# Patient Record
Sex: FEMALE | Race: ASIAN | NOT HISPANIC OR LATINO | Employment: FULL TIME | URBAN - METROPOLITAN AREA
[De-identification: names, ages, dates, MRNs, and addresses within clinical notes are randomized per-mention and may not be internally consistent; named-entity substitution may affect disease eponyms.]

---

## 2023-02-15 PROBLEM — Z90.710 HISTORY OF HYSTERECTOMY: Status: ACTIVE | Noted: 2023-02-15

## 2023-02-15 NOTE — PROGRESS NOTES
Assessment/Plan:    Encounter for gynecological examination (general) (routine) without abnormal findings  All well, no complaints aside from vaginal dryness/dyspareunia  Normal breast and pelvic exams with noted vaginismus  S/p hysterectomy  Mammo order given, last 10/26/22 reviewed on [employer] BIRADS 2B  Cologuard  per pt; orders given, aware to check for coverage with insurance prior to submitting  Dexa ordered, will check with insurance for coverage  Vaginismus  Options reviewed, directed to vaginismus  com for dilators and programs  Diagnoses and all orders for this visit:    Encounter for gynecological examination (general) (routine) without abnormal findings    Vaginal atrophy  -     Cologuard    Osteoporosis screening  -     DXA bone density spine hip and pelvis; Future    Asymptomatic menopausal state  -     DXA bone density spine hip and pelvis; Future    Other orders  -     BinaxNOW COVID-19 Ag Home Test KIT; Use as Directed on the Package  -     Multiple Vitamins-Minerals (Multivitamin Women 50+) TABS; Take by mouth  -     Cholecalciferol (Vitamin D-3) 125 MCG (5000 UT) TABS; Take by mouth  -     MAGNESIUM CITRATE PO; Take by mouth  -     Cyanocobalamin (Vitamin B 12) 500 MCG TABS; Take by mouth          Subjective:      Patient ID: Padmini Eden is a 62 y o  female  HPI Here for well check  , last 3/26/21     The following portions of the patient's history were reviewed and updated as appropriate:   She  has a past medical history of Cologuard testing (2020) and Personal history of left breast cancer ()    She   Patient Active Problem List    Diagnosis Date Noted   • Encounter for gynecological examination (general) (routine) without abnormal findings 2023   • Vaginismus 2023   • History of hysterectomy - 2008 02/15/2023   • Personal history of left breast cancer      She  has a past surgical history that includes  section; Breast lumpectomy (Left, 2007); Hysterectomy (2008); and Mammo (historical) (Bilateral, 12/18/2020)  Her family history is not on file  She  reports that she has never smoked  She has never used smokeless tobacco  She reports that she does not drink alcohol and does not use drugs  Current Outpatient Medications   Medication Sig Dispense Refill   • BinaxNOW COVID-19 Ag Home Test KIT Use as Directed on the Package     • Cholecalciferol (Vitamin D-3) 125 MCG (5000 UT) TABS Take by mouth     • Cyanocobalamin (Vitamin B 12) 500 MCG TABS Take by mouth     • MAGNESIUM CITRATE PO Take by mouth     • Multiple Vitamins-Minerals (Multivitamin Women 50+) TABS Take by mouth       No current facility-administered medications for this visit  She has No Known Allergies       Review of Systems  No breast, bladder, bowel changes   No new persistent pain, bloating, early satiety or pelvic pressure      Objective:      /70 (BP Location: Left arm, Patient Position: Sitting, Cuff Size: Standard)   Ht 5' (1 524 m)   Wt 57 8 kg (127 lb 6 4 oz)   BMI 24 88 kg/m²          Physical Exam    General appearance: no distress, pleasant  Neck: thyroid without nodules or thyromegaly, no palpable adenopathy  Lymph nodes: no palpable adenopathy  Breasts: no masses, nodes or skin changes; left well healed scar and XRT changes  Abdomen: soft, non tender, no palpable masses  Pelvic exam: normal atrophic external genitalia, urethral meatus normal, vagina atrophic with vaginismus noted with pedes spec and single digit, cuff intact, no adnexal masses, non tender  Rectal exam: normal sphincter tone, no masses, RV confirms above

## 2023-02-17 ENCOUNTER — ANNUAL EXAM (OUTPATIENT)
Dept: OBGYN CLINIC | Facility: CLINIC | Age: 58
End: 2023-02-17

## 2023-02-17 VITALS
HEIGHT: 60 IN | DIASTOLIC BLOOD PRESSURE: 70 MMHG | BODY MASS INDEX: 25.01 KG/M2 | WEIGHT: 127.4 LBS | SYSTOLIC BLOOD PRESSURE: 110 MMHG

## 2023-02-17 DIAGNOSIS — Z01.419 ENCOUNTER FOR GYNECOLOGICAL EXAMINATION (GENERAL) (ROUTINE) WITHOUT ABNORMAL FINDINGS: Primary | ICD-10-CM

## 2023-02-17 DIAGNOSIS — Z13.820 OSTEOPOROSIS SCREENING: ICD-10-CM

## 2023-02-17 DIAGNOSIS — N95.2 VAGINAL ATROPHY: ICD-10-CM

## 2023-02-17 DIAGNOSIS — Z78.0 ASYMPTOMATIC MENOPAUSAL STATE: ICD-10-CM

## 2023-02-17 PROBLEM — N94.2 VAGINISMUS: Status: ACTIVE | Noted: 2023-02-17

## 2023-02-17 RX ORDER — CYANOCOBALAMIN (VITAMIN B-12) 500 MCG
TABLET ORAL
COMMUNITY

## 2023-02-17 RX ORDER — COVID-19 MOLECULAR TEST ASSAY
KIT MISCELLANEOUS
COMMUNITY
Start: 2023-01-29

## 2023-02-17 NOTE — ASSESSMENT & PLAN NOTE
All well, no complaints aside from vaginal dryness/dyspareunia  Normal breast and pelvic exams with noted vaginismus  S/p hysterectomy  Mammo order given, last 10/26/22 reviewed on [employer] BIRADS 2B  Cologuard 2020 per pt; orders given, aware to check for coverage with insurance prior to submitting  Dexa ordered, will check with insurance for coverage

## 2023-02-17 NOTE — PATIENT INSTRUCTIONS
Return to office in one year unless having any problems such as breast changes, bleeding, new persistent pain, new progressive bloating, new problems eating (getting full to quickly) or new constant urinary pressure that does not resolve in one week  Call in six months to schedule your annual visit  Dilators are available [employer]  com    Call your insurance to see your out of pocket expense will be for the dexa (with the codes listed) and the cologuard at the lab listed

## 2023-02-17 NOTE — LETTER
February 17, 2023     Carolee Boas, DO  P O  [facility]    Patient: Kelly Grider   YOB: 1965   Date of Visit: 2/17/2023       Dear Dr Villatoro Arm: Thank you for referring Kelly Grider to me for evaluation  Below are my notes for this consultation  If you have questions, please do not hesitate to call me  I look forward to following your patient along with you  Sincerely,        Roopa Ibarra MD        CC: No Recipients  Roopa Ibarra MD  2/17/2023  9:04 AM  Sign when Signing Visit  Assessment/Plan:    Encounter for gynecological examination (general) (routine) without abnormal findings  All well, no complaints aside from vaginal dryness/dyspareunia  Normal breast and pelvic exams with noted vaginismus  S/p hysterectomy  Mammo order given, last 10/26/22 reviewed on [employer] 2B  Cologuard 2020 per pt; orders given, aware to check for coverage with insurance prior to submitting  Dexa ordered, will check with insurance for coverage  Vaginismus  Options reviewed, directed to vaginismus  com for dilators and programs  Diagnoses and all orders for this visit:    Encounter for gynecological examination (general) (routine) without abnormal findings    Vaginal atrophy  -     Cologuard    Osteoporosis screening  -     DXA bone density spine hip and pelvis; Future    Asymptomatic menopausal state  -     DXA bone density spine hip and pelvis; Future    Other orders  -     BinaxNOW COVID-19 Ag Home Test KIT; Use as Directed on the Package  -     Multiple Vitamins-Minerals (Multivitamin Women 50+) TABS; Take by mouth  -     Cholecalciferol (Vitamin D-3) 125 MCG (5000 UT) TABS; Take by mouth  -     MAGNESIUM CITRATE PO; Take by mouth  -     Cyanocobalamin (Vitamin B 12) 500 MCG TABS; Take by mouth         Subjective:     Patient ID: Kelly Grider is a 62 y o  female  HPI Here for well check  , last 3/26/21     The following portions of the patient's history were reviewed and updated as appropriate:   She  has a past medical history of Cologuard testing (2020) and Personal history of left breast cancer ()  She   Patient Active Problem List    Diagnosis Date Noted   • Encounter for gynecological examination (general) (routine) without abnormal findings 2023   • Vaginismus 2023   • History of hysterectomy - 2008 02/15/2023   • Personal history of left breast cancer      She  has a past surgical history that includes  section; Breast lumpectomy (Left, ); Hysterectomy (); and Mammo (historical) (Bilateral, 2020)  Her family history is not on file  She  reports that she has never smoked  She has never used smokeless tobacco  She reports that she does not drink alcohol and does not use drugs  Current Outpatient Medications   Medication Sig Dispense Refill   • BinaxNOW COVID-19 Ag Home Test KIT Use as Directed on the Package     • Cholecalciferol (Vitamin D-3) 125 MCG (5000 UT) TABS Take by mouth     • Cyanocobalamin (Vitamin B 12) 500 MCG TABS Take by mouth     • MAGNESIUM CITRATE PO Take by mouth     • Multiple Vitamins-Minerals (Multivitamin Women 50+) TABS Take by mouth       No current facility-administered medications for this visit  She has No Known Allergies       Review of Systems No breast, bladder, bowel changes   No new persistent pain, bloating, early satiety or pelvic pressure      Objective:      /70 (BP Location: Left arm, Patient Position: Sitting, Cuff Size: Standard)   Ht 5' (1 524 m)   Wt 57 8 kg (127 lb 6 4 oz)   BMI 24 88 kg/m²         Physical Exam   General appearance: no distress, pleasant  Neck: thyroid without nodules or thyromegaly, no palpable adenopathy  Lymph nodes: no palpable adenopathy  Breasts: no masses, nodes or skin changes; left well healed scar and XRT changes  Abdomen: soft, non tender, no palpable masses  Pelvic exam: normal atrophic external genitalia, urethral meatus normal, vagina atrophic with vaginismus noted with pedes spec and single digit, cuff intact, no adnexal masses, non tender  Rectal exam: normal sphincter tone, no masses, RV confirms above

## 2023-10-19 ENCOUNTER — TELEPHONE (OUTPATIENT)
Dept: OBGYN CLINIC | Facility: CLINIC | Age: 58
End: 2023-10-19

## 2023-10-30 ENCOUNTER — TELEPHONE (OUTPATIENT)
Dept: OBGYN CLINIC | Facility: CLINIC | Age: 58
End: 2023-10-30

## 2023-10-30 NOTE — TELEPHONE ENCOUNTER
Reviewed provided results and recommendations with patient. Patient wishes to be seen this week if possible due to insurance changes occurring soon. Patient transferred to  to be scheduled.

## 2023-10-30 NOTE — TELEPHONE ENCOUNTER
Patient requesting the results of her Dexa scan. Results printed off and sent to [facility] to be scanned in patient's chart and confirmation fax received. Patient requesting a call back and inquiring if she will need to start taking calcium supplements or start dietary changes. Dr. Carloz Trevino, please advise once Dexa scan arrives in chart. Hard copy also printed it out too as well.

## 2023-10-30 NOTE — TELEPHONE ENCOUNTER
Reviewed on [employer]. She has osteoporosis in the hip and should be seen to discuss therapeutic options including medication. Until her visit she should definitely strive for 1200 mg of calcium and 1000 IU of vitamin D daily in diet and supplements combined. You can only absorb 600 mg of calcium at a time. Avoid excess calcium as this may adversely effect your heart. There are 400 mg of calcium in an 8 oz serving of dairy.   Thanks

## 2023-10-31 ENCOUNTER — OFFICE VISIT (OUTPATIENT)
Dept: OBGYN CLINIC | Facility: CLINIC | Age: 58
End: 2023-10-31
Payer: COMMERCIAL

## 2023-10-31 VITALS
DIASTOLIC BLOOD PRESSURE: 80 MMHG | HEIGHT: 60 IN | WEIGHT: 129.4 LBS | SYSTOLIC BLOOD PRESSURE: 126 MMHG | BODY MASS INDEX: 25.4 KG/M2

## 2023-10-31 DIAGNOSIS — Z85.3 PERSONAL HISTORY OF BREAST CANCER: ICD-10-CM

## 2023-10-31 DIAGNOSIS — M81.0 AGE-RELATED OSTEOPOROSIS WITHOUT CURRENT PATHOLOGICAL FRACTURE: Primary | ICD-10-CM

## 2023-10-31 PROCEDURE — 99214 OFFICE O/P EST MOD 30 MIN: CPT | Performed by: OBSTETRICS & GYNECOLOGY

## 2023-10-31 RX ORDER — ALENDRONATE SODIUM 70 MG/1
70 TABLET ORAL
Qty: 12 TABLET | Refills: 4 | Status: SHIPPED | OUTPATIENT
Start: 2023-10-31

## 2023-10-31 NOTE — PATIENT INSTRUCTIONS
Continue to strive for 1200 mg of calcium and 1000 IU of vitamin D daily in diet and supplements combined for your bone health. You can only absorb 600 mg of calcium at a time. Avoid excess calcium as this may adversely effect your heart. There are 400 mg of calcium in an 8 oz serving of dairy.

## 2023-10-31 NOTE — ASSESSMENT & PLAN NOTE
Reviewed dexa from [facility]- 10/25/23 dexa with spine T-1.8; hip T-2.1; fem neck T-3.3    Discussed osteoporosis and impact on bone health. Reviewed risk of hip and vertebral fracture risk. Reviewed impact of hip fracture on overall morbidity and mortality. Encouraged consideration of health impact of diagnosis balanced with risks of medications when making decisions on medications versus conservative monitoring. Recommend consideration of medication to decrease fracture risk. Risks and benefits of bisphosphonates, specifically Fosamax and Reclast reviewed. Aware of GI side effects of bisphosphonates and appropriate administration technique to decrease risk. Aware of increased risk of osteonecrosis of jaw, muscle and joint aches and increased risk of long bone fracture with prolonged use of bisphosphonates. Reviewed appropriate calcium intake in dairy and supplement form to achieve recommended 1200-1500mg per day. Advised against over supplementing of calcium which may be associated with cardiovascular risk. Vitamin D supplementation recommended and dosing discussed. At the end of our discussion, pt would like to start Fosamax. Reviewed recent labs from PCP. I have spent a total time of 35 minutes on 10/31/23 in caring for this patient including Diagnostic results, Risks and benefits of tx options, Instructions for management, Impressions, Counseling / Coordination of care, Documenting in the medical record, Reviewing / ordering tests, medicine, procedures  , and Obtaining or reviewing history  . Repeat dexa in 2 years for follow up.

## 2023-10-31 NOTE — PROGRESS NOTES
Assessment/Plan:    Osteoporosis without current pathological fracture  [facility]- 10/25/23 dexa with spine T-1.8; hip T-2.1; fem neck T-3.3    Discussed osteoporosis and impact on bone health. Reviewed risk of hip and vertebral fracture risk. Reviewed impact of hip fracture on overall morbidity and mortality. Encouraged consideration of health impact of diagnosis balanced with risks of medications when making decisions on medications versus conservative monitoring. Recommend consideration of medication to decrease fracture risk. Risks and benefits of bisphosphonates, specifically Fosamax and Reclast reviewed. Aware of GI side effects of bisphosphonates and appropriate administration technique to decrease risk. Aware of increased risk of osteonecrosis of jaw, muscle and joint aches and increased risk of long bone fracture with prolonged use of bisphosphonates. Reviewed appropriate calcium intake in dairy and supplement form to achieve recommended 1200-1500mg per day. Advised against over supplementing of calcium which may be associated with cardiovascular risk. Vitamin D supplementation recommended and dosing discussed. At the end of our discussion, pt would like to start Fosamax. Reviewed recent labs from PCP. I have spent a total time of 35 minutes on 10/31/23 in caring for this patient including Diagnostic results, Risks and benefits of tx options, Instructions for management, Impressions, Counseling / Coordination of care, Documenting in the medical record, Reviewing / ordering tests, medicine, procedures  , and Obtaining or reviewing history  . Repeat dexa in 2 years for follow up. Diagnoses and all orders for this visit:    Age-related osteoporosis without current pathological fracture  -     alendronate (FOSAMAX) 70 mg tablet; Take 1 tablet (70 mg total) by mouth every 7 days    Personal history of left breast cancer  -     Ambulatory Referral to [facility];  Future Subjective:      Patient ID: Cherl Boast is a 62 y.o. female. HPI Here for dexa discussion    The following portions of the patient's history were reviewed and updated as appropriate: She  has a past medical history of Cologuard testing (2020) and Personal history of left breast cancer (). She   Patient Active Problem List    Diagnosis Date Noted    Osteoporosis without current pathological fracture 10/31/2023    Encounter for gynecological examination (general) (routine) without abnormal findings 2023    Vaginismus 2023    History of hysterectomy - 2008 02/15/2023    Personal history of left breast cancer      She  has a past surgical history that includes  section; Breast lumpectomy (Left, ); Hysterectomy (); and Mammo (historical) (Bilateral, 2020). Her family history is not on file. She  reports that she has never smoked. She has never used smokeless tobacco. She reports that she does not drink alcohol and does not use drugs. Current Outpatient Medications   Medication Sig Dispense Refill    alendronate (FOSAMAX) 70 mg tablet Take 1 tablet (70 mg total) by mouth every 7 days 12 tablet 4    Cholecalciferol (Vitamin D-3) 125 MCG (5000 UT) TABS Take by mouth      Cyanocobalamin (Vitamin B 12) 500 MCG TABS Take 5,000 mcg by mouth in the morning      MAGNESIUM CITRATE PO Take by mouth      Multiple Vitamins-Minerals (Multivitamin Women 50+) TABS Take by mouth      BinaxNOW COVID-19 Ag Home Test KIT Use as Directed on the Package (Patient not taking: Reported on 10/31/2023)       No current facility-administered medications for this visit. She has No Known Allergies. .    Review of Systems      Objective:      /80   Ht 4' 11.5" (1.511 m)   Wt 58.7 kg (129 lb 6.4 oz)   Breastfeeding No   BMI 25.70 kg/m²          Physical Exam    Appears well, no apparent distress. Does not appear anxious or depressed.

## 2023-11-20 DIAGNOSIS — Z13.820 OSTEOPOROSIS SCREENING: ICD-10-CM

## 2023-11-20 DIAGNOSIS — Z78.0 ASYMPTOMATIC MENOPAUSAL STATE: ICD-10-CM

## 2024-01-26 ENCOUNTER — TELEPHONE (OUTPATIENT)
Dept: OBGYN CLINIC | Facility: CLINIC | Age: 59
End: 2024-01-26

## 2024-01-26 DIAGNOSIS — Z12.31 ENCOUNTER FOR SCREENING MAMMOGRAM FOR MALIGNANT NEOPLASM OF BREAST: Primary | ICD-10-CM

## 2024-01-26 NOTE — TELEPHONE ENCOUNTER
Patient is requesting a mammogram order be entered so she can print it from her [employer] and get her imaging done soon.    If appropriate, please enter order and let patient know.      Thank you!

## 2024-01-26 NOTE — TELEPHONE ENCOUNTER
Patient called and left v/m requesting a mammogram order.  Please advise if pt needs regular screening mammogram or something more in depth.  Dr. Mcnamara, please advise.

## 2024-01-26 NOTE — TELEPHONE ENCOUNTER
Mammogram order placed.  Spoke with patient and informed her order is placed. Patient advised to print out on [employer].  She verbalized understanding and voiced appreciation.

## 2024-01-29 NOTE — TELEPHONE ENCOUNTER
Patient called requesting mammo order be mailed to her confirmed address in chart.  Mailed as requested

## 2024-02-02 DIAGNOSIS — Z12.31 ENCOUNTER FOR SCREENING MAMMOGRAM FOR MALIGNANT NEOPLASM OF BREAST: ICD-10-CM

## 2024-02-21 PROBLEM — Z01.419 ENCOUNTER FOR GYNECOLOGICAL EXAMINATION (GENERAL) (ROUTINE) WITHOUT ABNORMAL FINDINGS: Status: RESOLVED | Noted: 2023-02-17 | Resolved: 2024-02-21

## 2024-02-21 LAB — COLOGUARD RESULT REPORTABLE: NEGATIVE

## 2024-02-21 NOTE — PROGRESS NOTES
Assessment/Plan:    Encounter for gynecological examination (general) (routine) without abnormal findings  Here for well check, doing well on fosamax with mild joint/muscle aches.   No breast or gyn complaints.  Normal breast and pelvic exams s/p hysterectomy  Mammo order given, last 24  Cologuard negative 2024, due   Dexa 10/2023, osteoporosis with fosamax initiation; due        Diagnoses and all orders for this visit:    Encounter for gynecological examination (general) (routine) without abnormal findings    Personal history of left breast cancer  -     Mammo screening bilateral w 3d & cad; Future    Age-related osteoporosis without current pathological fracture    History of hysterectomy -     Encounter for screening mammogram for malignant neoplasm of breast  -     Mammo screening bilateral w 3d & cad; Future    Other orders  -     Misc Natural Products (HM Joint Health Ultra) TABS; Take by mouth          Subjective:      Patient ID: Marry Moreno is a 59 y.o. female.    HPI Here for well check.    The following portions of the patient's history were reviewed and updated as appropriate: She  has a past medical history of Cologuard testing (2020), History of screening mammography (2024), and Personal history of left breast cancer ().  She   Patient Active Problem List    Diagnosis Date Noted    Osteoporosis without current pathological fracture 10/31/2023    Vaginismus 2023    History of hysterectomy - 2008 02/15/2023    Personal history of left breast cancer      She  has a past surgical history that includes  section; Breast lumpectomy (Left, ); Hysterectomy (); and Mammo (historical) (Bilateral, 2020).  Her family history is not on file.  She  reports that she has never smoked. She has never used smokeless tobacco. She reports that she does not drink alcohol and does not use drugs.  Current Outpatient Medications   Medication Sig Dispense Refill     alendronate (FOSAMAX) 70 mg tablet Take 1 tablet (70 mg total) by mouth every 7 days 12 tablet 4    Cholecalciferol (Vitamin D-3) 125 MCG (5000 UT) TABS Take by mouth      Cyanocobalamin (Vitamin B 12) 500 MCG TABS Take 5,000 mcg by mouth in the morning      MAGNESIUM CITRATE PO Take by mouth      Misc Natural Products (HM Joint Health Ultra) TABS Take by mouth      Multiple Vitamins-Minerals (Multivitamin Women 50+) TABS Take by mouth       No current facility-administered medications for this visit.     She has No Known Allergies..    Review of Systems  No breast, bladder, bowel changes. No new persistent pain, bloating, early satiety or pelvic pressure  Some joint/muscle aches with fosamax and before    Objective:      /72   Ht 5' (1.524 m)   Wt 60.5 kg (133 lb 6.4 oz)   Breastfeeding No   BMI 26.05 kg/m²          Physical Exam    General appearance: no distress, pleasant  Neck: thyroid without nodules or thyromegaly, no palpable adenopathy  Lymph nodes: no palpable adenopathy  Breasts: no masses, nodes or skin changes; s/p left lumpectomy and XRT  Abdomen: soft, non tender, no palpable masses  Pelvic exam: normal atrophic external genitalia, urethral meatus normal, *pedes spec*vagina atrophic without lesions, cuff intact, no adnexal masses, non tender  Rectal exam: normal sphincter tone, no masses, RV confirms above

## 2024-02-23 ENCOUNTER — ANNUAL EXAM (OUTPATIENT)
Dept: OBGYN CLINIC | Facility: CLINIC | Age: 59
End: 2024-02-23
Payer: COMMERCIAL

## 2024-02-23 VITALS
DIASTOLIC BLOOD PRESSURE: 72 MMHG | BODY MASS INDEX: 26.19 KG/M2 | HEIGHT: 60 IN | SYSTOLIC BLOOD PRESSURE: 110 MMHG | WEIGHT: 133.4 LBS

## 2024-02-23 DIAGNOSIS — Z12.31 ENCOUNTER FOR SCREENING MAMMOGRAM FOR MALIGNANT NEOPLASM OF BREAST: ICD-10-CM

## 2024-02-23 DIAGNOSIS — Z90.710 HISTORY OF HYSTERECTOMY: ICD-10-CM

## 2024-02-23 DIAGNOSIS — Z01.419 ENCOUNTER FOR GYNECOLOGICAL EXAMINATION (GENERAL) (ROUTINE) WITHOUT ABNORMAL FINDINGS: Primary | ICD-10-CM

## 2024-02-23 DIAGNOSIS — Z85.3 PERSONAL HISTORY OF BREAST CANCER: ICD-10-CM

## 2024-02-23 DIAGNOSIS — M81.0 AGE-RELATED OSTEOPOROSIS WITHOUT CURRENT PATHOLOGICAL FRACTURE: ICD-10-CM

## 2024-02-23 PROCEDURE — S0612 ANNUAL GYNECOLOGICAL EXAMINA: HCPCS | Performed by: OBSTETRICS & GYNECOLOGY

## 2024-02-23 RX ORDER — MULTIVITAMIN/IRON/FOLIC ACID 18MG-0.4MG
TABLET ORAL
COMMUNITY

## 2024-02-23 NOTE — ASSESSMENT & PLAN NOTE
Here for well check, doing well on fosamax with mild joint/muscle aches.   No breast or gyn complaints.  Normal breast and pelvic exams s/p hysterectomy  Mammo order given, last 2/1/24  Cologuard negative 2/2024, due 2027  Dexa 10/2023, osteoporosis with fosamax initiation; due 2025

## 2024-02-23 NOTE — LETTER
February 23, 2024     Vance Morejon DO  [address]  [place] [address]    Patient: Marry Moreno   YOB: 1965   Date of Visit: 2/23/2024       Dear Dr. Morejon:    Marry Moreno was in today for her annual gyn exam. Below are my notes for this consultation.    If you have questions, please do not hesitate to call me. I look forward to following your patient along with you.         Sincerely,        Randa Mcnamara MD        CC: No Recipients    Randa Mcnamara MD  2/23/2024  8:49 AM  Sign when Signing Visit  Assessment/Plan:    Encounter for gynecological examination (general) (routine) without abnormal findings  Here for well check, doing well on fosamax with mild joint/muscle aches.   No breast or gyn complaints.  Normal breast and pelvic exams s/p hysterectomy  Mammo order given, last 2/1/24  Cologuard negative 2/2024, due 2027  Dexa 10/2023, osteoporosis with fosamax initiation; due 2025       Diagnoses and all orders for this visit:    Encounter for gynecological examination (general) (routine) without abnormal findings    Personal history of left breast cancer  -     Mammo screening bilateral w 3d & cad; Future    Age-related osteoporosis without current pathological fracture    History of hysterectomy - 2008    Encounter for screening mammogram for malignant neoplasm of breast  -     Mammo screening bilateral w 3d & cad; Future    Other orders  -     Misc Natural Products (HM Joint Health Ultra) TABS; Take by mouth          Subjective:      Patient ID: Marry Moreno is a 59 y.o. female.    HPI Here for well check.    The following portions of the patient's history were reviewed and updated as appropriate: She  has a past medical history of Cologuard testing (12/2020), History of screening mammography (02/01/2024), and Personal history of left breast cancer (2007).  She   Patient Active Problem List    Diagnosis Date Noted   • Osteoporosis without current pathological fracture 10/31/2023   •  Vaginismus 2023   • History of hysterectomy - 2008 02/15/2023   • Personal history of left breast cancer      She  has a past surgical history that includes  section; Breast lumpectomy (Left, ); Hysterectomy (); and Mammo (historical) (Bilateral, 2020).  Her family history is not on file.  She  reports that she has never smoked. She has never used smokeless tobacco. She reports that she does not drink alcohol and does not use drugs.  Current Outpatient Medications   Medication Sig Dispense Refill   • alendronate (FOSAMAX) 70 mg tablet Take 1 tablet (70 mg total) by mouth every 7 days 12 tablet 4   • Cholecalciferol (Vitamin D-3) 125 MCG (5000 UT) TABS Take by mouth     • Cyanocobalamin (Vitamin B 12) 500 MCG TABS Take 5,000 mcg by mouth in the morning     • MAGNESIUM CITRATE PO Take by mouth     • Misc Natural Products (HM Joint Health Ultra) TABS Take by mouth     • Multiple Vitamins-Minerals (Multivitamin Women 50+) TABS Take by mouth       No current facility-administered medications for this visit.     She has No Known Allergies..    Review of Systems  No breast, bladder, bowel changes. No new persistent pain, bloating, early satiety or pelvic pressure  Some joint/muscle aches with fosamax and before    Objective:      /72   Ht 5' (1.524 m)   Wt 60.5 kg (133 lb 6.4 oz)   Breastfeeding No   BMI 26.05 kg/m²          Physical Exam    General appearance: no distress, pleasant  Neck: thyroid without nodules or thyromegaly, no palpable adenopathy  Lymph nodes: no palpable adenopathy  Breasts: no masses, nodes or skin changes; s/p left lumpectomy and XRT  Abdomen: soft, non tender, no palpable masses  Pelvic exam: normal atrophic external genitalia, urethral meatus normal, *pedes spec*vagina atrophic without lesions, cuff intact, no adnexal masses, non tender  Rectal exam: normal sphincter tone, no masses, RV confirms above

## 2024-03-25 ENCOUNTER — TELEPHONE (OUTPATIENT)
Age: 59
End: 2024-03-25

## 2024-03-25 DIAGNOSIS — Z12.11 COLON CANCER SCREENING: Primary | ICD-10-CM

## 2024-03-25 NOTE — TELEPHONE ENCOUNTER
New order with colon cancer screening code attached (z12.11). Please contact company. Not sure how to correct this without sending a new kit to her house.   Thanks.

## 2024-03-25 NOTE — TELEPHONE ENCOUNTER
Placed call to [employer] at [phone], spoke to Costa. Provided with new colon cancer screening diagnosis code of z12.11; Costa updated diagnosis code.

## 2024-03-25 NOTE — TELEPHONE ENCOUNTER
Patient called stating that the wrong code on her Cologuard was submitted. It needs to be preventative care not diagnostic. Patient is requesting that the right code be resubmitted. She received a bill in the mail.

## 2024-04-10 ENCOUNTER — TELEPHONE (OUTPATIENT)
Age: 59
End: 2024-04-10

## 2024-11-22 DIAGNOSIS — M81.0 AGE-RELATED OSTEOPOROSIS WITHOUT CURRENT PATHOLOGICAL FRACTURE: ICD-10-CM

## 2024-11-22 RX ORDER — ALENDRONATE SODIUM 70 MG/1
70 TABLET ORAL WEEKLY
Qty: 12 TABLET | Refills: 0 | Status: SHIPPED | OUTPATIENT
Start: 2024-11-22

## 2025-02-09 DIAGNOSIS — M81.0 AGE-RELATED OSTEOPOROSIS WITHOUT CURRENT PATHOLOGICAL FRACTURE: ICD-10-CM

## 2025-02-10 RX ORDER — ALENDRONATE SODIUM 70 MG/1
70 TABLET ORAL WEEKLY
Qty: 4 TABLET | Refills: 0 | Status: SHIPPED | OUTPATIENT
Start: 2025-02-10

## 2025-03-07 DIAGNOSIS — M81.0 AGE-RELATED OSTEOPOROSIS WITHOUT CURRENT PATHOLOGICAL FRACTURE: ICD-10-CM

## 2025-03-07 RX ORDER — ALENDRONATE SODIUM 70 MG/1
70 TABLET ORAL WEEKLY
Qty: 4 TABLET | Refills: 0 | Status: SHIPPED | OUTPATIENT
Start: 2025-03-07

## 2025-04-06 DIAGNOSIS — M81.0 AGE-RELATED OSTEOPOROSIS WITHOUT CURRENT PATHOLOGICAL FRACTURE: ICD-10-CM

## 2025-04-08 RX ORDER — ALENDRONATE SODIUM 70 MG/1
70 TABLET ORAL WEEKLY
Qty: 4 TABLET | Refills: 0 | Status: SHIPPED | OUTPATIENT
Start: 2025-04-08

## 2025-05-01 DIAGNOSIS — M81.0 AGE-RELATED OSTEOPOROSIS WITHOUT CURRENT PATHOLOGICAL FRACTURE: ICD-10-CM

## 2025-05-01 RX ORDER — ALENDRONATE SODIUM 70 MG/1
70 TABLET ORAL WEEKLY
Qty: 12 TABLET | Refills: 0 | Status: SHIPPED | OUTPATIENT
Start: 2025-05-01

## 2025-08-08 ENCOUNTER — TELEPHONE (OUTPATIENT)
Age: 60
End: 2025-08-08

## 2025-08-08 DIAGNOSIS — Z12.31 ENCOUNTER FOR SCREENING MAMMOGRAM FOR MALIGNANT NEOPLASM OF BREAST: Primary | ICD-10-CM

## 2025-08-08 DIAGNOSIS — Z85.3 PERSONAL HISTORY OF BREAST CANCER: ICD-10-CM

## 2025-08-14 ENCOUNTER — HOSPITAL ENCOUNTER (OUTPATIENT)
Age: 60
Discharge: HOME | End: 2025-08-14
Attending: OBSTETRICS & GYNECOLOGY
Payer: COMMERCIAL

## 2025-08-22 ENCOUNTER — ANNUAL EXAM (OUTPATIENT)
Dept: OBGYN CLINIC | Facility: CLINIC | Age: 60
End: 2025-08-22
Payer: COMMERCIAL

## 2025-08-22 VITALS
BODY MASS INDEX: 25.68 KG/M2 | SYSTOLIC BLOOD PRESSURE: 100 MMHG | WEIGHT: 136 LBS | DIASTOLIC BLOOD PRESSURE: 62 MMHG | HEIGHT: 61 IN

## 2025-08-22 DIAGNOSIS — Z90.710 HISTORY OF HYSTERECTOMY: ICD-10-CM

## 2025-08-22 DIAGNOSIS — Z01.419 ENCOUNTER FOR GYNECOLOGICAL EXAMINATION (GENERAL) (ROUTINE) WITHOUT ABNORMAL FINDINGS: Primary | ICD-10-CM

## 2025-08-22 DIAGNOSIS — Z85.3 PERSONAL HISTORY OF BREAST CANCER: ICD-10-CM

## 2025-08-22 DIAGNOSIS — Z12.31 ENCOUNTER FOR SCREENING MAMMOGRAM FOR MALIGNANT NEOPLASM OF BREAST: ICD-10-CM

## 2025-08-22 DIAGNOSIS — M81.0 AGE-RELATED OSTEOPOROSIS WITHOUT CURRENT PATHOLOGICAL FRACTURE: ICD-10-CM

## 2025-08-22 DIAGNOSIS — N95.2 VAGINAL ATROPHY: ICD-10-CM

## 2025-08-22 PROCEDURE — S0612 ANNUAL GYNECOLOGICAL EXAMINA: HCPCS | Performed by: OBSTETRICS & GYNECOLOGY

## 2025-08-22 RX ORDER — CREATINE 100 %
POWDER (GRAM) MISCELLANEOUS DAILY PRN
COMMUNITY

## 2025-08-22 RX ORDER — ESTRADIOL 0.1 MG/G
1 CREAM VAGINAL 2 TIMES WEEKLY
Qty: 42.5 G | Refills: 1 | Status: SHIPPED | OUTPATIENT
Start: 2025-08-25

## 2025-08-24 PROBLEM — M21.70 LEG LENGTH DISCREPANCY: Status: ACTIVE | Noted: 2025-08-24

## 2025-08-24 PROBLEM — R73.09 ELEVATED GLUCOSE: Status: ACTIVE | Noted: 2025-08-24

## 2025-08-24 PROBLEM — M85.80 OSTEOPENIA: Status: ACTIVE | Noted: 2025-08-24

## 2025-08-24 PROBLEM — E78.00 PURE HYPERCHOLESTEROLEMIA: Status: ACTIVE | Noted: 2025-08-24
